# Patient Record
(demographics unavailable — no encounter records)

---

## 2024-12-10 NOTE — REASON FOR VISIT
[CV Risk Factors and Non-Cardiac Disease] : CV risk factors and non-cardiac disease [Hyperlipidemia] : hyperlipidemia [Other: ____] : [unfilled] [Follow-Up - Clinic] : a clinic follow-up of [Mitral Regurgitation] : mitral regurgitation [FreeTextEntry1] : aortic valve regurgitation, mild tricuspid regurgitation, murmur

## 2024-12-10 NOTE — PHYSICAL EXAM
[Well Developed] : well developed [Well Nourished] : well nourished [No Acute Distress] : no acute distress [Normal Venous Pressure] : normal venous pressure [No Carotid Bruit] : no carotid bruit [Normal S1, S2] : normal S1, S2 [No Rub] : no rub [I] : a grade 1 [Clear Lung Fields] : clear lung fields [Good Air Entry] : good air entry [No Respiratory Distress] : no respiratory distress  [Soft] : abdomen soft [Non Tender] : non-tender [No Masses/organomegaly] : no masses/organomegaly [Normal Bowel Sounds] : normal bowel sounds [Normal Gait] : normal gait [No Edema] : no edema [No Cyanosis] : no cyanosis [No Clubbing] : no clubbing [No Varicosities] : no varicosities [No Rash] : no rash [No Skin Lesions] : no skin lesions [Moves all extremities] : moves all extremities [No Focal Deficits] : no focal deficits [Normal Speech] : normal speech [Alert and Oriented] : alert and oriented [Normal memory] : normal memory [General Appearance - Well Developed] : well developed [Normal Appearance] : normal appearance [Well Groomed] : well groomed [General Appearance - Well Nourished] : well nourished [No Deformities] : no deformities [General Appearance - In No Acute Distress] : no acute distress [Normal Conjunctiva] : the conjunctiva exhibited no abnormalities [Normal Oral Mucosa] : normal oral mucosa [Normal Oropharynx] : normal oropharynx [Normal Jugular Venous A Waves Present] : normal jugular venous A waves present [Normal Jugular Venous V Waves Present] : normal jugular venous V waves present [No Jugular Venous Diaz A Waves] : no jugular venous diaz A waves [] : no respiratory distress [Respiration, Rhythm And Depth] : normal respiratory rhythm and effort [Exaggerated Use Of Accessory Muscles For Inspiration] : no accessory muscle use [Auscultation Breath Sounds / Voice Sounds] : lungs were clear to auscultation bilaterally [Bowel Sounds] : normal bowel sounds [Abdomen Soft] : soft [Abnormal Walk] : normal gait [Gait - Sufficient For Exercise Testing] : the gait was sufficient for exercise testing [Nail Clubbing] : no clubbing of the fingernails [Cyanosis, Localized] : no localized cyanosis [Skin Color & Pigmentation] : normal skin color and pigmentation [Oriented To Time, Place, And Person] : oriented to person, place, and time [Affect] : the affect was normal [Mood] : the mood was normal [No Anxiety] : not feeling anxious [5th Left ICS - MCL] : palpated at the 5th LICS in the midclavicular line [Normal] : normal [No Precordial Heave] : no precordial heave was noted [Normal Rate] : normal [Rhythm Regular] : regular [Normal S1] : normal S1 [Normal S2] : normal S2 [No Gallop] : no gallop heard [II] : a grade 2 [2+] : left 2+ [No Abnormalities] : the abdominal aorta was not enlarged and no bruit was heard [No Pitting Edema] : no pitting edema present [Apical Thrill] : no thrill palpable at the apex [S3] : no S3 [S4] : no S4 [Click] : no click [Pericardial Rub] : no pericardial rub [Right Carotid Bruit] : no bruit heard over the right carotid [Left Carotid Bruit] : no bruit heard over the left carotid [Right Femoral Bruit] : no bruit heard over the right femoral artery [Left Femoral Bruit] : no bruit heard over the left femoral artery

## 2024-12-10 NOTE — DISCUSSION/SUMMARY
[FreeTextEntry1] : This is a 63 year-old male with past medical history for mitral valve prolapse with mild to moderate mitral valve regurgitation, coronary artery calcification, mild aortic valve regurgitation, occasional palpitations, comes in for evaluation.  He denies chest pain, shortness of breath, dizziness or syncope. He has no history of rheumatic fever.  He does not drink excessive caffeine or alcohol. His cardiac risk factors include positive family history of heart disease (his sister had a myocardial infarction age 61 and received 3 coronary stents). Electrocardiogram done December 10, 2024 demonstrated normal sinus rhythm rate 66 bpm is otherwise unremarkable. The patient enjoys traveling, he has been on [facility] 2 or 3 times, and is planning to go to [facility] in the future. Lipid panel done May 21, 2024 demonstrated cholesterol 140, HDL of 84, triglycerides 58, LDL calculated 44, non-HDL cholesterol 56, hemoglobin A1c of 4.9. He will have new blood work done today for hemoglobin A1c, lipid profile, SMA 20. He continues on his current dose of Crestor 10 mg daily. The patient had a normal exercise stress test May 21, 2024. Echo Doppler examination done December 8, 2023 demonstrated mitral valve prolapse with moderate to severe mitral valve regurgitation, mild tricuspid valve regurgitation, mild aortic valve regurgitation, trace pulmonic valve regurgitation with ejection fraction of 66%. Lipid panel done December 8, 2023 demonstrated cholesterol 120, HDL 78, triglycerides 44, LDL calculated 31, non-HDL cholesterol 42, LDL direct 41 mg/dL with a hemoglobin A1c of 4.9. The patient returned from [facility] and had a wonderful time. Electrocardiogram done December 8, 2023 demonstrated sinus bradycardia rate of 58 bpm is otherwise remarkable for right interventricular conduction delay. Given the present coronary artery calcification, the patient's LDL target is less than 70 mg/dL. The patient will have new blood work done for lipid panel, electrolytes, SMA 20. Lipid panel done April 12, 2023 demonstrated cholesterol 139, HDL 91, triglycerides 41, LDL calculated 40 mg/dL, LDL direct 41 mg/dL and non-HDL cholesterol 48 mg/dL with hemoglobin A1c of 4.9. Echo Doppler examination done October 12, 2022 demonstrated normal left ventricular function with estimated ejection fraction of 65 to 70% with prolapse of the posterior mitral valve leaflet and moderate mitral valve regurgitation, mild tricuspid valve regurgitation, trace pulmonic valve regurgitation, minimal to mild aortic valve regurgitation. The patient had a normal exercise stress test October 12, 2022. The patient had a coronary artery calcium score done January 6, 2021 which demonstrated 38 units in the left anterior descending artery and none in the remaining arteries Lipid panel done April 6, 2022 demonstrated cholesterol 117, triglycerides of 43, HDL of 70, LDL calculated 39 mg/dL. He will continue on his current dose of Crestor therapy.  He exercises on a regular basis and will continue to do so.  The patient had a normal exercise stress test December 22, 2020.  EKG done May 20, 2021 demonstrated normal sinus rhythm rate of 70 beats per minute is otherwise unremarkable.  The patient understands that aerobic exercises must be increased to 40 minutes 4 times per week. A detailed discussion of lifestyle modification was done today. The patient has a good understanding of the diagnosis, and treatment plan. Lifestyle modification was also outlined.

## 2024-12-10 NOTE — ASSESSMENT
[FreeTextEntry1] : December 8, 2023: This patient was seen with nurse practitioner Hoa This is a 62-year-old male here today for follow-up cardiac evaluation.  He has a past medical history significant for mitral valve regurgitation, occasional palpitations, coronary artery calcification.   HPI: Today he is feeling generally well and does not have any complaints at this time. Denies chest pain, palpitations, dizziness, shortness of breath, and syncope.   He remains on Rosuvastatin 10mg PO DAILY for primary prevention since he did have a CA score of 38.   -He states that he stays active by biking multiple times a week, golf and he also hikes and goes hunting.   -Cardiac risk factors -The patient reports that his sister at age 61 had a myocardial infarction requiring 3 stents.    BLOOD PRESSURE: -BP is well controlled in today's visit.    BLOOD WORK: -New blood work was done 12/08/2023 to evaluate lipid profile, CBC, BMP, hepatic function, A1C and TSH. -New blood work done 04/12/2023 demonstrated cholesterol 139, triglycerides 41, HDL 91, LDL 40, LDL direct 41  -Blood work done October 12, 2022 demonstrated labs WDL. -New blood work was done today, 04/05/2022 demonstrated labs WDL. -Blood work was done by his PCP on 9/3/2021 which demonstrated normal lipid profile and LDL of 37.  -He remains on Rosuvastatin 10mg PO DAILY.   TESTING: -Echocardiogram done 12/08/2023 in office with results pending.   -EKG done 12/08/2023 which demonstrated sinus bradycardia with nonspecific ST-T wave changes BPM of 59.  -EKG done 04/12/2023 which demonstrated regular sinus rhythm with nonspecific ST-T wave changes BPM of 63.  -The patient had a normal exercise stress test October 12, 2022.  -Echocardiogram done October 14, 2022 demonstrated mildly dilated left atrium, prolapse of the posterior mitral leaflet, moderate mitral valve regurgitation, trace pulmonic regurgitation, mild tricuspid regurgitation with normal left ventricular systolic function ejection fraction 70%.  -EKG done 04/05/2022 which demonstrated regular sinus rhythm with nonspecific ST-T wave changes BPM of 66 with PVC. He denies palpitations at this time.  -The patient had a normal exercise stress test December 22, 2020.  -Echocardiogram done on 12/22/2020 demonstrated MVP with mild mitral, tricuspid and pulmonic regurgitation with normal left ventricular systolic function.  -The patient had a coronary artery calcium score done January 6, 2021 which demonstrated 38 units in the left anterior descending artery and none in the remaining arteries and was started on Rosuvastatin 20mg PO DAILY.    PLAN: -The patient is clinically stable from a cardiac standpoint on today's exam.  -He will continue with his usual medications and will contact the office if he is having any complaints between now and their next follow up appointment.  -Follow up 4-6 months.   The patient understands that aerobic exercises must be increased to ~40 minutes 4 times/week and a detailed discussion of lifestyle modification was done today.  The patient has a good understanding of the diagnosis, treatment plan and lifestyle modification.  He will contact me at the office for any questions with their care or any changes in their health status.  The patient was discussed with supervising physician, Dr. Rivera while present in the office at the time of the visit.  MAUDE Camara NP

## 2024-12-10 NOTE — CARDIOLOGY SUMMARY
[___] : [unfilled] [de-identified] : 4/5/2022 [de-identified] : 12/22/2020 [de-identified] : 12/22/2020

## 2025-07-14 NOTE — DISCUSSION/SUMMARY
[FreeTextEntry1] : This is a 63 year-old male with past medical history for mitral valve prolapse with mild to moderate mitral valve regurgitation, coronary artery calcification, mild aortic valve regurgitation, occasional palpitations, comes in for evaluation.  He denies chest pain, shortness of breath, dizziness or syncope. He has no history of rheumatic fever.  He does not drink excessive caffeine or alcohol. His cardiac risk factors include positive family history of heart disease (his sister had a myocardial infarction age 61 and received 3 coronary stents). Exercise stress test done July 14, 2025 demonstrated no definitive evidence of myocardial ischemia, however, the patient developed a transient rate related left bundle branch block at a heart rate of 140 bpm. I have recommended the patient schedule coronary CTA to rule out significant coronary artery disease. The patient had a hypertensive response to exercise.  He will start monitoring his blood pressure on a regular basis. He will follow-up with his primary care physician in 1 month to recheck his blood pressure.  In the meantime he will make his appointment for his coronary CTA. Lipid panel done December 10, 2024 demonstrated cholesterol 137, HDL of 89, triglycerides 35, LDL calculated 39, non-HDL cholesterol 49, LDL direct 44 mg/dL with hemoglobin A1c of 4.7. Lifestyle modification, salt restriction, overall reinforced.   Electrocardiogram done December 10, 2024 demonstrated normal sinus rhythm rate 66 bpm is otherwise unremarkable. The patient enjoys traveling, he has been on [facility] 2 or 3 times, and is planning to go to [facility] in the future. Lipid panel done May 21, 2024 demonstrated cholesterol 140, HDL of 84, triglycerides 58, LDL calculated 44, non-HDL cholesterol 56, hemoglobin A1c of 4.9. He will have new blood work done today for hemoglobin A1c, lipid profile, SMA 20. He continues on his current dose of Crestor 10 mg daily. The patient had a normal exercise stress test May 21, 2024. Echo Doppler examination done December 8, 2023 demonstrated mitral valve prolapse with moderate to severe mitral valve regurgitation, mild tricuspid valve regurgitation, mild aortic valve regurgitation, trace pulmonic valve regurgitation with ejection fraction of 66%. Lipid panel done December 8, 2023 demonstrated cholesterol 120, HDL 78, triglycerides 44, LDL calculated 31, non-HDL cholesterol 42, LDL direct 41 mg/dL with a hemoglobin A1c of 4.9. The patient returned from [facility] and had a wonderful time. Electrocardiogram done December 8, 2023 demonstrated sinus bradycardia rate of 58 bpm is otherwise remarkable for right interventricular conduction delay. Given the present coronary artery calcification, the patient's LDL target is less than 70 mg/dL. The patient will have new blood work done for lipid panel, electrolytes, SMA 20. Lipid panel done April 12, 2023 demonstrated cholesterol 139, HDL 91, triglycerides 41, LDL calculated 40 mg/dL, LDL direct 41 mg/dL and non-HDL cholesterol 48 mg/dL with hemoglobin A1c of 4.9. Echo Doppler examination done October 12, 2022 demonstrated normal left ventricular function with estimated ejection fraction of 65 to 70% with prolapse of the posterior mitral valve leaflet and moderate mitral valve regurgitation, mild tricuspid valve regurgitation, trace pulmonic valve regurgitation, minimal to mild aortic valve regurgitation. The patient had a normal exercise stress test October 12, 2022. The patient had a coronary artery calcium score done January 6, 2021 which demonstrated 38 units in the left anterior descending artery and none in the remaining arteries Lipid panel done April 6, 2022 demonstrated cholesterol 117, triglycerides of 43, HDL of 70, LDL calculated 39 mg/dL. He will continue on his current dose of Crestor therapy.  He exercises on a regular basis and will continue to do so.  The patient had a normal exercise stress test December 22, 2020.  EKG done May 20, 2021 demonstrated normal sinus rhythm rate of 70 beats per minute is otherwise unremarkable.  The patient understands that aerobic exercises must be increased to 40 minutes 4 times per week. A detailed discussion of lifestyle modification was done today. The patient has a good understanding of the diagnosis, and treatment plan. Lifestyle modification was also outlined.

## 2025-07-14 NOTE — CARDIOLOGY SUMMARY
[___] : [unfilled] [de-identified] : 4/5/2022 [de-identified] : 12/22/2020 [de-identified] : 12/22/2020